# Patient Record
Sex: MALE | Race: WHITE | NOT HISPANIC OR LATINO | Employment: OTHER | URBAN - METROPOLITAN AREA
[De-identification: names, ages, dates, MRNs, and addresses within clinical notes are randomized per-mention and may not be internally consistent; named-entity substitution may affect disease eponyms.]

---

## 2019-01-01 ENCOUNTER — INITIAL CONSULT (OUTPATIENT)
Dept: OTOLARYNGOLOGY | Facility: CLINIC | Age: 84
End: 2019-01-01
Payer: MEDICARE

## 2019-01-01 ENCOUNTER — OFFICE VISIT (OUTPATIENT)
Dept: OTOLARYNGOLOGY | Facility: CLINIC | Age: 84
End: 2019-01-01
Payer: MEDICARE

## 2019-01-01 VITALS — WEIGHT: 154.13 LBS | HEIGHT: 62 IN | BODY MASS INDEX: 28.36 KG/M2

## 2019-01-01 DIAGNOSIS — H60.42 KERATOSIS OBTURANS OF EXTERNAL EAR CANAL, LEFT: Primary | ICD-10-CM

## 2019-01-01 DIAGNOSIS — H91.90 HEARING LOSS, UNSPECIFIED HEARING LOSS TYPE, UNSPECIFIED LATERALITY: Primary | ICD-10-CM

## 2019-01-01 DIAGNOSIS — H92.02 OTALGIA, LEFT: ICD-10-CM

## 2019-01-01 PROCEDURE — 99999 PR PBB SHADOW E&M-EST. PATIENT-LVL II: CPT | Mod: PBBFAC,,, | Performed by: NURSE PRACTITIONER

## 2019-01-01 PROCEDURE — 1101F PT FALLS ASSESS-DOCD LE1/YR: CPT | Mod: CPTII,S$GLB,, | Performed by: OTOLARYNGOLOGY

## 2019-01-01 PROCEDURE — 99499 NO LOS: ICD-10-PCS | Mod: S$GLB,,, | Performed by: NURSE PRACTITIONER

## 2019-01-01 PROCEDURE — 99213 OFFICE O/P EST LOW 20 MIN: CPT | Mod: 25,S$GLB,, | Performed by: OTOLARYNGOLOGY

## 2019-01-01 PROCEDURE — 69210 REMOVE IMPACTED EAR WAX UNI: CPT | Mod: GC,S$GLB,, | Performed by: STUDENT IN AN ORGANIZED HEALTH CARE EDUCATION/TRAINING PROGRAM

## 2019-01-01 PROCEDURE — 99999 PR PBB SHADOW E&M-EST. PATIENT-LVL II: ICD-10-PCS | Mod: PBBFAC,,, | Performed by: NURSE PRACTITIONER

## 2019-01-01 PROCEDURE — 99999 PR PBB SHADOW E&M-EST. PATIENT-LVL II: CPT | Mod: PBBFAC,,, | Performed by: OTOLARYNGOLOGY

## 2019-01-01 PROCEDURE — 69210 PR REMOVAL IMPACTED CERUMEN REQUIRING INSTRUMENTATION, UNILATERAL: ICD-10-PCS | Mod: GC,S$GLB,, | Performed by: STUDENT IN AN ORGANIZED HEALTH CARE EDUCATION/TRAINING PROGRAM

## 2019-01-01 PROCEDURE — 99213 PR OFFICE/OUTPT VISIT, EST, LEVL III, 20-29 MIN: ICD-10-PCS | Mod: 25,S$GLB,, | Performed by: OTOLARYNGOLOGY

## 2019-01-01 PROCEDURE — 1101F PR PT FALLS ASSESS DOC 0-1 FALLS W/OUT INJ PAST YR: ICD-10-PCS | Mod: CPTII,S$GLB,, | Performed by: OTOLARYNGOLOGY

## 2019-01-01 PROCEDURE — 99499 UNLISTED E&M SERVICE: CPT | Mod: S$GLB,,, | Performed by: NURSE PRACTITIONER

## 2019-01-01 PROCEDURE — 99999 PR PBB SHADOW E&M-EST. PATIENT-LVL II: ICD-10-PCS | Mod: PBBFAC,,, | Performed by: OTOLARYNGOLOGY

## 2019-01-01 RX ORDER — CIPROFLOXACIN AND DEXAMETHASONE 3; 1 MG/ML; MG/ML
4 SUSPENSION/ DROPS AURICULAR (OTIC) 2 TIMES DAILY
Qty: 7.5 ML | Refills: 0 | Status: SHIPPED | OUTPATIENT
Start: 2019-01-01 | End: 2019-01-01

## 2019-03-22 PROBLEM — H92.02 OTALGIA, LEFT: Status: ACTIVE | Noted: 2019-01-01

## 2019-03-22 NOTE — PROGRESS NOTES
Subjective:       Patient ID: Yadiel Lopez is a 88 y.o. male.    Chief Complaint: Otalgia and Ear Fullness    HPI     Yadiel Lopez presents to the [facility] for ear pain and fullness. This morning, he noticed dried blood on his pillowcase. He had some left ear discomfort and difficulty hearing from his left ear. He then put a qtip in his ear. When he removed it he noticed bright red blood. He has not noticed any more bleeding. He is currently undergoing treatment for prostate cancer at [facility]. No other head and neck complaints.    Past Medical History:   Diagnosis Date    Disorder of kidney and ureter     Elevated PSA     Hypertension        Past Surgical History:   Procedure Laterality Date    anal fistula repair  1976    APPENDECTOMY      CATARACT EXTRACTION BILATERAL W/ ANTERIOR VITRECTOMY  2012    PROSTATE SURGERY           Current Outpatient Medications:     amlodipine (NORVASC) 10 MG tablet, , Disp: , Rfl:     aspirin (ECOTRIN) 81 MG EC tablet, Take 81 mg by mouth once daily., Disp: , Rfl:     atorvastatin (LIPITOR) 10 MG tablet, , Disp: , Rfl:     bicalutamide (CASODEX) 50 MG Tab, Take 1 tablet (50 mg total) by mouth once daily., Disp: 90 tablet, Rfl: 3    ciprofloxacin-dexamethasone 0.3-0.1% (CIPRODEX) 0.3-0.1 % DrpS, Place 4 drops into the left ear 2 (two) times daily. for 10 days, Disp: 7.5 mL, Rfl: 0    fluticasone (FLONASE) 50 mcg/actuation nasal spray, , Disp: , Rfl:     leuprolide (LUPRON DEPOT) 7.5 mg SyKt, Inject into the muscle., Disp: , Rfl:     potassium citrate 15 mEq TbSR, , Disp: , Rfl:     VITAMIN D2 50,000 unit capsule, , Disp: , Rfl:     Review of patient's allergies indicates:  No Known Allergies    Social History     Socioeconomic History    Marital status:      Spouse name: Not on file    Number of children: Not on file    Years of education: Not on file    Highest education level: Not on file   Occupational History    Not on file   Social Needs     Financial resource strain: Not on file    Food insecurity:     Worry: Not on file     Inability: Not on file    Transportation needs:     Medical: Not on file     Non-medical: Not on file   Tobacco Use    Smoking status: Never Smoker   Substance and Sexual Activity    Alcohol use: No    Drug use: No    Sexual activity: Not on file   Lifestyle    Physical activity:     Days per week: Not on file     Minutes per session: Not on file    Stress: Not on file   Relationships    Social connections:     Talks on phone: Not on file     Gets together: Not on file     Attends [employer] service: Not on file     Active member of club or organization: Not on file     Attends meetings of clubs or organizations: Not on file     Relationship status: Not on file    Intimate partner violence:     Fear of current or ex partner: Not on file     Emotionally abused: Not on file     Physically abused: Not on file     Forced sexual activity: Not on file   Other Topics Concern    Not on file   Social History Narrative    Not on file       Family History   Problem Relation Age of Onset    Heart disease Father     Stroke Father     Heart disease Mother     Stroke Mother     Lymphoma Son     Prostate cancer Neg Hx     Bladder Cancer Neg Hx          Review of Systems   Constitutional: Positive for fatigue. Negative for appetite change, chills, diaphoresis, fever and unexpected weight change.   HENT: Positive for ear pain and hearing loss. Negative for congestion, dental problem, drooling, ear discharge, facial swelling, mouth sores, nosebleeds, postnasal drip, rhinorrhea, sinus pressure, sneezing, sore throat, tinnitus, trouble swallowing and voice change.    Eyes: Negative for pain, discharge, redness and itching.   Respiratory: Negative for cough and shortness of breath.    Cardiovascular: Negative for chest pain.   Gastrointestinal: Negative for abdominal distention, abdominal pain, diarrhea, nausea and vomiting.    Endocrine: Negative for cold intolerance and heat intolerance.   Genitourinary: Negative for difficulty urinating.   Musculoskeletal: Negative for neck pain and neck stiffness.   Skin: Negative for rash.   Neurological: Negative for dizziness, weakness and headaches.   Hematological: Negative for adenopathy.       Objective:      Physical Exam   Constitutional: He is oriented to person, place, and time. He appears well-developed and well-nourished. No distress.   HENT:   Head: Normocephalic and atraumatic.   Right Ear: Hearing, tympanic membrane, external ear and ear canal normal.   Left Ear: External ear normal. Decreased hearing is noted.   Wax mixed with dried blood removed from left ear. Unable to completely remove wax due to discomfort. Small area of exposed bone to left ear canal.   Cardiovascular: Normal rate.   Pulmonary/Chest: Effort normal.   Neurological: He is alert and oriented to person, place, and time.   Skin: He is not diaphoretic.   Vitals reviewed.      Assessment:       1. Hearing loss, unspecified hearing loss type, unspecified laterality    2. Otalgia, left        Plan:       Problem List Items Addressed This Visit        ENT    Otalgia, left     Yadiel Lopez has some exposed bone to his left ear canal. Unable to completely remove wax due to discomfort. Ciprodex prescription sent to pharmacy. Patient referred to Dr. Melendrez and hearing test ordered. Questions answered.         Relevant Orders    COMPREHENSIVE AUDIOGRAM      Other Visit Diagnoses     Hearing loss, unspecified hearing loss type, unspecified laterality    -  Primary    Relevant Orders    COMPREHENSIVE AUDIOGRAM

## 2019-03-25 NOTE — ASSESSMENT & PLAN NOTE
Yadiel Lopez has some exposed bone to his left ear canal. Unable to completely remove wax due to discomfort. Ciprodex prescription sent to pharmacy. Patient referred to Dr. Melendrez and hearing test ordered. Questions answered.

## 2019-03-26 NOTE — PROGRESS NOTES
Subjective:       Patient ID: Yadiel Lopez is a 88 y.o. male.    Chief Complaint: No chief complaint on file.    HPI   88 year old male who presents to otology clinic as a new patient for evaluation of left ear otalgia with bloody otorrhea.  He noticed about one week ago some dark bloody otorrhea and mild ear fullness.  He presents to clinic today after seeing Marivel Barrera, ENT NP, who noticed exposed bone in the left EAC.  Today, he feels some mild fullness of the ear still, but denies hearing loss or pain.  He has not had any more bloody otorrhea.  He is otherwise without complaint.    Review of Systems   Constitutional: Negative.  Negative for activity change, appetite change, chills, diaphoresis and fatigue.   HENT: Negative for congestion, dental problem, facial swelling, sinus pressure, sinus pain and voice change.    Eyes: Negative.  Negative for pain and discharge.   Respiratory: Negative.  Negative for apnea and stridor.    Cardiovascular: Negative.  Negative for chest pain.   Gastrointestinal: Negative.  Negative for nausea and vomiting.   Endocrine: Negative.    Genitourinary: Negative.    Musculoskeletal: Negative for arthralgias, gait problem and neck stiffness.   Skin: Negative for rash and wound.   Allergic/Immunologic: Negative.    Neurological: Negative.  Negative for seizures and syncope.   Hematological: Negative.  Negative for adenopathy. Does not bruise/bleed easily.   Psychiatric/Behavioral: Negative for behavioral problems and confusion.           Objective:      Physical Exam   Constitutional: He appears well-developed and well-nourished.   HENT:   Head: Normocephalic and atraumatic.   Right Ear: Hearing, tympanic membrane, external ear and ear canal normal. No drainage, swelling or tenderness. No middle ear effusion. No decreased hearing is noted.   Left Ear: Hearing, tympanic membrane and external ear normal. No drainage, swelling or tenderness.  No middle ear effusion. No decreased  hearing is noted.   Ears:        Procedure Note:    The patient was brought to the minor procedure room and placed under the operating microscope. Using a combination of suction, curettes and cup forceps the patient's left cerumen impaction was removed. The tympanic membrane was evaluated and was with findings as above. The patient tolerated the procedure well. There were no complications.    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